# Patient Record
(demographics unavailable — no encounter records)

---

## 2025-02-06 NOTE — ASSESSMENT
[FreeTextEntry1] : 69-year-old female with prior history of microscopic hematuria.  She had undergone renal ultrasound which was negative.  Her repeat urinalysis 6 months ago was negative with 0 RBC per hpf.  We discussed at that time the workup given her age making her high risk would require CT urogram and cystoscopy.  We agreed to repeat the urinalysis and discussed with the workup would be.  If she has microscopic materia we will call patient and schedule for CT urogram with cystoscopy.  However if it is negative again we will encourage patient to continue annual urinalysis  Plan Urinalysis Urine culture Will call with results and if positive schedule for CT urogram and cystoscopy   Patient is being seen today for evaluation and management of a chronic and longitudinal ongoing condition and I am of the primary treating physician.

## 2025-02-06 NOTE — HISTORY OF PRESENT ILLNESS
[FreeTextEntry1] : 69 yo female with history of microscopic hematuria.  She had undergone prior renal ultrasound which was negative for hydronephrosis stones or masses.  Her repeat urinalysis was negative with 0 RBC per hpf.  She deferred the cystoscopy and we discussed repeating her urinalysis.  Currently she has no urinary symptoms.  Denies any history of smoking and no family history of  malignancy. She denies history of UTIs.

## 2025-02-06 NOTE — END OF VISIT
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 (CMS/[facility])  (primary encounter diagnosis)  M51.36 DDD (degenerative disc disease), lumbar  M54.16 Lumbar radiculopathy    Planned Procedure: Sacroiliac Joint Injection    Planned Anesthetic:  local      MEDICAL HISTORY / COMORBID CONDITIONS:  Medical / surgical history    Past Medical History:   Diagnosis Date   • Arthritis     bilateral in knees and and hands   • Chronic pain     lower abdomen, left groin (shingles), knees bilateral from artheritis   • COPD (chronic obstructive pulmonary disease) (CMS/[facility])    • Diverticulitis    • Essential (primary) hypertension    • Ischemic cardiomyopathy    • NSTEMI (non-ST elevated myocardial infarction) (CMS/[facility]) 2018   • Pneumonia    • Shingles 2018   • Uncomplicated senile dementia    • Urinary tract infection        Normal mental status:   yes    EXAMINATION PERTINENT TO PROCEDURE BEING PERFORMED  Evaluation of operative site     BACK:  Gait is normal.  The patient can walk on heels and toes.    The thoracolumbar spine has a normal alignment.  The pelvis is level.    The patient is moderately tender to palpation.    The SI joints are moderately tender.   The sciatica notches are nontender.   The patient has 90 degrees of forward flexion. Side bending and trunk rotation are normal.      Neurologic exam:  Deep tendon reflexes are two plus and symmetrical at the patella tendon and the Achilles tendon.  Sensation to light touch is normal in a dermatomal distribution.  Motor strength is 5/5 in a dermatomal distribution.      Straight leg raising is negative bilateral , in both a sitting position and supine.    NECK: The cervical spine has a full range of motion. There are no  [Time Spent: ___ minutes] : I have spent [unfilled] minutes of time on the encounter which excludes teaching and separately reported services. radicular signs with extension and compression of the cervical spine.     OTHER FINDINGS  Reviewed current medications and allergies yes      PERTINENT LAB / DIAGNOSTIC TESTS  PROTIME (sec)   Date Value   12/21/2018 10.6      INR (no units)   Date Value   12/21/2018 1.0       INFORMED CONSENT  Consent obtained: yes    Risks / benefits, complications, and alternatives explained, questions answered and patient / personal representative agrees to procedure listed above and anesthetic listed above.